# Patient Record
Sex: MALE | Race: WHITE
[De-identification: names, ages, dates, MRNs, and addresses within clinical notes are randomized per-mention and may not be internally consistent; named-entity substitution may affect disease eponyms.]

---

## 2022-10-31 ENCOUNTER — HOSPITAL ENCOUNTER (OUTPATIENT)
Dept: HOSPITAL 76 - DI | Age: 22
Discharge: HOME | End: 2022-10-31
Attending: STUDENT IN AN ORGANIZED HEALTH CARE EDUCATION/TRAINING PROGRAM
Payer: COMMERCIAL

## 2022-10-31 DIAGNOSIS — S76.112A: Primary | ICD-10-CM

## 2022-10-31 NOTE — MRI REPORT
PROCEDURE:  KNEE WO - LT

 

INDICATIONS:  LEFT KNEE PAIN

 

TECHNIQUE:  

Noncontrast sagittal PD fast spin echo and T2 fast spin echo with fat saturation, sagittal 3-D gradie
nt sequence with fat saturation; coronal T1 spin echo and PD fast spin echo with fat saturation, and 
axial PD fast spin echo with fat saturation through the knee.  

 

COMPARISON:  None.

 

FINDINGS:  

Image quality:  Excellent.  

 

Menisci:  The medial and lateral menisci demonstrate normal morphology and internal signal.  The meni
scal root ligaments appear intact.  

 

Cruciate ligaments:  The anterior and posterior cruciate ligaments appear intact.  

 

Medial structures:  The medial collateral ligament appears intact.  The posterior oblique ligament, s
emimembranosus tendon insertions, and oblique popliteal ligament, and meniscocapsular junction appear
 intact.  Visualized portions of the pes anserinus tendons appear normal.  No abnormal bursal fluid. 
 

 

Lateral structures:  The lateral collateral ligament, long and short heads of the biceps femoris tend
on appear intact.  The popliteus tendon appears normal; the popliteofibular ligament appears intact. 
Iliotibial band appears normal.  

 

Anterior structures: Distal quadriceps tendon is intact. Markedly thickened proximal to mid patella t
endon at its inferior patella insertion is seen with significant intrasubstance T2 hyperintense signa
l and significant edema within infrapatellar fat pad. Patellar alignment is normal.  No femoral troch
lear dysplasia or ventral trochlear prominence.

 

Bones and cartilage: Marrow edema involving inferior portion of patella is seen at proximal patella t
endon insertion site. No discrete fracture line is noted. No other area of abnormal marrow signal. Th
e cartilage of the medial and lateral femorotibial compartments, as well as the patellofemoral compar
tment, appears normal in thickness.  

 

Joint space:  There is physiologic knee joint fluid.  No Baker's cyst.  Normal appearing synovial pli
 are incidentally noted.  

 

IMPRESSION:

1. Moderate grade partial thickness tear involving proximal patella tendon at its inferior patella in
sertion with avulsion injury involving lower pole of patella and marrow edema. No full-thickness tend
on rupture. Quadriceps tendon is intact.

2. No other area of abnormal marrow signal. Articulating cartilages are intact.

3. No evidence of focal meniscal tear.

4. Cruciate ligaments are intact.

 

Reviewed by: Juan Baird MD on 10/31/2022 5:22 PM PDT

Approved by: Juan Baird MD on 10/31/2022 5:22 PM PDT

 

 

Station ID:  [id number]

## 2023-05-12 ENCOUNTER — HOSPITAL ENCOUNTER (EMERGENCY)
Dept: HOSPITAL 76 - ED | Age: 23
Discharge: HOME | End: 2023-05-12
Payer: COMMERCIAL

## 2023-05-12 VITALS — DIASTOLIC BLOOD PRESSURE: 80 MMHG | SYSTOLIC BLOOD PRESSURE: 150 MMHG

## 2023-05-12 DIAGNOSIS — G89.18: Primary | ICD-10-CM

## 2023-05-12 DIAGNOSIS — M25.562: ICD-10-CM

## 2023-05-12 PROCEDURE — 96372 THER/PROPH/DIAG INJ SC/IM: CPT

## 2023-05-12 PROCEDURE — 99283 EMERGENCY DEPT VISIT LOW MDM: CPT

## 2023-05-12 NOTE — ED PHYSICIAN DOCUMENTATION
History of Present Illness





- Stated complaint


Stated Complaint: LT KNEE PX





- Chief complaint


Chief Complaint: Ext Problem





- History obtained from


History obtained from: Patient (He had arthroscopy with tendon repair of the 

patellar tendon by Dr. Fisher in [place] yesterday.  His pain became out of

control last night despite oxycodone and Tylenol and was referred here for pain 

management.)





PD PAST MEDICAL HISTORY





- Present Medications


Home Medications: 


                                Ambulatory Orders











 Medication  Instructions  Recorded  Confirmed


 


Ibuprofen [Motrin] 800 mg PO Q8H PRN #30 tablet 05/12/23 


 


oxyCODONE [Roxicodone] 1 - 4 tab PO Q4-6H PRN #25 tablet 05/12/23 














- Allergies


Allergies/Adverse Reactions: 


                                    Allergies











Allergy/AdvReac Type Severity Reaction Status Date / Time


 


No Known Drug Allergies Allergy   Verified 05/12/23 12:51














PD ED PE NORMAL





- Vitals


Vital signs reviewed: Yes





- General


General: Alert and oriented X 3, No acute distress





- Extremities


Extremities: Other (The left knee joint is clean dry with small incisions intact

 and no redness or warmth.  It was not ranged.)





- Neuro


Neuro: Alert and oriented X 3, Normal speech





Results





- Vitals


Vitals: 


                               Vital Signs - 24 hr











  05/12/23





  12:48


 


Temperature 37 C


 


Heart Rate 65


 


Respiratory 16





Rate 


 


Blood Pressure 153/95 H


 


O2 Saturation 97








                                     Oxygen











O2 Source                      Room air

















PD Medical Decision Making





- ED course


ED course: 





22-year-old gentleman with uncontrolled postoperative pain.  He was given 1 mg 

of Dilaudid and 60 mg of IM Toradol here with improvement.





Departure





- Departure


Disposition: 01 Home, Self Care


Clinical Impression: 


 Postoperative pain of left knee





Condition: Good


Instructions:  ED Post Op Pain


Prescriptions: 


Ibuprofen [Motrin] 800 mg PO Q8H PRN #30 tablet


 PRN Reason: PAIN &/OR FEVER


oxyCODONE [Roxicodone] 1 - 4 tab PO Q4-6H PRN #25 tablet


 PRN Reason: Pain


Comments: 


I sent your prescriptions electronically to [facility] in [place]





In addition to the prescribed anti-inflammatory and refilled oxycodone you can 

and should take 2 extra strength Tylenol every 6 hours for pain.  When you are 

able make sure your surgeon knows what troubles you are having.  Return if 

worse.





I am prescribing a short course of narcotic pain medication for you. These are 

potentially dangerous and addictive medications that should be used carefully.


These medications may constipate you. Take an over-the-counter stool softener 

(docusate) twice daily with plenty of water while taking these medications. If 

you go 24 hours without a bowel movement, take over-the-counter miralax, per 

package instructions.


Do not drink or drive while taking these medications.


If you received narcotic or sedating medications [facility] do not drive for 24 hours.


Store this medication in a safe, secure place and out of reach of children.


It is a violation of federal law to give or sell this medication to another 

person or to use in a manner other than prescribed.


The ED will not refill narcotic prescriptions, including prescriptions lost or 

stolen.


To dispose of unwanted medications:


1. [facility] [address] in 

[place] has a medication drop box. They accept prescription medications (in 

pill form) Monday through Friday 9:00 a.m. to 5:00 p.m.


2. The [facility] [employer] accepts prescription medications (in

 pill form only) for disposal year round. Call [phone] for more 

information.


3. Contact the [facility] for the next [facility] sponsored prescription 

drug collection event. [phone], [phone], or [phone] 

[phone];


Note that many narcotic pain relievers also contain Tylenol/acetaminophen.  

Please ensure that your total dose of acetaminophen from all sources does not 

exceed 3 g (3000 mg) per day.